# Patient Record
Sex: MALE
[De-identification: names, ages, dates, MRNs, and addresses within clinical notes are randomized per-mention and may not be internally consistent; named-entity substitution may affect disease eponyms.]

---

## 2019-06-23 ENCOUNTER — HOSPITAL ENCOUNTER (EMERGENCY)
Dept: HOSPITAL 97 - ER | Age: 24
Discharge: HOME | End: 2019-06-23
Payer: SELF-PAY
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
## 2019-06-23 DIAGNOSIS — Z23: ICD-10-CM
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
## 2019-06-23 DIAGNOSIS — S51.831A: Primary | ICD-10-CM

## 2019-06-23 PROCEDURE — 90714 TD VACC NO PRESV 7 YRS+ IM: CPT

## 2019-06-23 PROCEDURE — 90471 IMMUNIZATION ADMIN: CPT

## 2019-06-23 PROCEDURE — 99284 EMERGENCY DEPT VISIT MOD MDM: CPT

## 2019-06-23 NOTE — EDPHYS
Physician Documentation                                                                           

 [facility]                                                                 

Name: Sergio Alvarez Arellanes                                                                    

Age: 23 yrs                                                                                       

Sex: Male                                                                                         

: 1995                                                                                   

MRN: [id number]                                                                                   

Arrival Date: 2019                                                                          

Time: 01:21                                                                                       

Account#: [id number]                                                                            

Bed 12                                                                                            

Private MD:                                                                                       

ED Physician Prabhu Hyman                                                                             

HPI:                                                                                              

                                                                                             

02:38 This 23 yrs old  Male presents to ER via Ambulatory with complaints of Puncture pkl 

      Wound To Arm - by nail.                                                                     

02:38 The patient or guardian complains of pain, that is acute, nail puncture wound . The     pkl 

      complaints affect the right forearm. Onset: The symptoms/episode began/occurred just        

      prior to arrival.                                                                           

                                                                                                  

Historical:                                                                                       

- Allergies:                                                                                      

01:35 No Known Allergies;                                                                     aa1 

- Home Meds:                                                                                      

01:35 None [Active];                                                                          aa1 

- PMHx:                                                                                           

01:35 None;                                                                                   aa1 

- PSHx:                                                                                           

01:35 Tonsillectomy;                                                                          aa1 

                                                                                                  

- Immunization history:: Last tetanus immunization: unknown.                                      

- Social history:: Smoking status: Patient/guardian denies using tobacco.                         

- Ebola Screening: : No symptoms or risks identified at this time.                                

                                                                                                  

                                                                                                  

ROS:                                                                                              

02:38 Eyes: Negative for injury, pain, redness, and discharge, ENT: Negative for injury,      pkl 

      pain, and discharge, Neck: Negative for injury, pain, and swelling, Cardiovascular:         

      Negative for chest pain, palpitations, and edema, Respiratory: Negative for shortness       

      of breath, cough, wheezing, and pleuritic chest pain, Abdomen/GI: Negative for              

      abdominal pain, nausea, vomiting, diarrhea, and constipation, Back: Negative for injury     

      and pain, : Negative for injury, bleeding, discharge, and swelling.                       

02:38 MS/extremity: Positive for pain, puncture, of the right  forearm.                           

02:38 Neuro: Negative for altered mental status.                                                  

                                                                                                  

Exam:                                                                                             

02:38 Head/Face:  Normocephalic, atraumatic. Eyes:  Pupils equal round and reactive to light, pkl 

      extra-ocular motions intact.  Lids and lashes normal.  Conjunctiva and sclera are           

      non-icteric and not injected.  Cornea within normal limits.  Periorbital areas with no      

      swelling, redness, or edema. ENT:  Nares patent. No nasal discharge, no septal              

      abnormalities noted.  Tympanic membranes are normal and external auditory canals are        

      clear.  Oropharynx with no redness, swelling, or masses, exudates, or evidence of           

      obstruction, uvula midline.  Mucous membranes moist. Neck:  Trachea midline, no             

      thyromegaly or masses palpated, and no cervical lymphadenopathy.  Supple, full range of     

      motion without nuchal rigidity, or vertebral point tenderness.  No Meningismus.             

      Chest/axilla:  Normal chest wall appearance and motion.  Nontender with no deformity.       

      No lesions are appreciated. Cardiovascular:  Regular rate and rhythm with a normal S1       

      and S2.  No gallops, murmurs, or rubs.  Normal PMI, no JVD.  No pulse deficits.             

      Respiratory:  Lungs have equal breath sounds bilaterally, clear to auscultation and         

      percussion.  No rales, rhonchi or wheezes noted.  No increased work of breathing, no        

      retractions or nasal flaring. Abdomen/GI:  Soft, non-tender, with normal bowel sounds.      

      No distension or tympany.  No guarding or rebound.  No evidence of tenderness               

      throughout. Back:  No spinal tenderness.  No costovertebral tenderness.  Full range of      

      motion. Neuro:  Awake and alert, GCS 15, oriented to person, place, time, and               

      situation.  Cranial nerves II-XII grossly intact.  Motor strength 5/5 in all                

      extremities.  Sensory grossly intact.  Cerebellar exam normal.  Normal gait.                

02:38 Musculoskeletal/extremity: Extremities: grossly normal except: noted in the right           

      forearm: pain, No  active  bleeding  noted.                                                 

                                                                                                  

Vital Signs:                                                                                      

01:35  / 76; Pulse 65; Resp 16; Temp 98.5; Pulse Ox 98% on R/A; Weight 77.11 kg; Height aa1 

      5 ft. 7 in. (170.18 cm); Pain 6/10;                                                         

02:54  / 67; Pulse 62; Resp 16; Pulse Ox 99% on R/A; Pain 4/10;                         aa1 

01:35 Body Mass Index 26.63 (77.11 kg, 170.18 cm)                                             aa1 

                                                                                                  

Procedures:                                                                                       

02:38 Puncture wound thoroughly cleansed. Antibiotic started. Follow up with Dr. Mcclain in 2 pkl 

      to 3 days. Return if necessary. Patient understood instructions.                            

                                                                                                  

MDM:                                                                                              

01:59 Patient medically screened.                                                             pkl 

02:38 Data reviewed: vital signs, nurses notes, radiologic studies, plain films.              [facility] 

                                                                                                  

                                                                                             

02:03 Order name: Forearm Right XRAY                                                          [facility] 

                                                                                             

02:38 Order name: Arm-Sling; Complete Time: 02:47                                             pkl 

                                                                                                  

Administered Medications:                                                                         

02:17 Drug: Tetanus-Diphtheria Toxoid Adult 0.5 ml {: [employer]. Exp:         aa1 

      2021. Lot #: [id number]. } Route: IM; Site: right deltoid;                                

02:47 Follow up: Response: No adverse reaction                                                aa1 

02:18 Drug: Norco (7.5 mg-325 mg) 1 tabs Route: PO;                                           aa1 

02:54 Follow up: Response: No adverse reaction; Pain is decreased                             aa1 

02:53 Drug: KeFLEX 500 mg Route: PO;                                                          aa1 

02:53 Follow up: Response: No adverse reaction; Medication administered at discharge.         aa1 

                                                                                                  

                                                                                                  

Disposition:                                                                                      

19 02:46 Discharged to Home. Impression: Puncture wound right forearm.                      

- Condition is Stable.                                                                            

                                                                                                  

- Prescriptions for Keflex 500 mg Oral Capsule - take 1 capsule by ORAL route every 6             

  hours for 7 days; 28 capsule. Ultram 50 mg Oral Tablet - take 1 tablet by ORAL route            

  every 8 hours As needed; 20 tablet.                                                             

- Work release form, Medication Reconciliation Form, Thank You Letter, Antibiotic                 

  Education, Prescription Opioid Use form.                                                        

- Follow up: Luis Felipe Parkinson MD; When: 2 - 3 days; Reason: Re-evaluation by your                    

  physician.                                                                                      

- Problem is new.                                                                                 

- Symptoms have improved.                                                                         

                                                                                                  

                                                                                                  

                                                                                                  

Signatures:                                                                                       

Dispatcher [employer]                           Ca Morales RN                  RN   aa1                                                  

Prabhu Hyman MD MD   pkl                                                  

Addison Savage                                 ag4                                                  

                                                                                                  

Corrections: (The following items were deleted from the chart)                                    

03:14 02:46 2019 02:46 Discharged to Home. Impression: Puncture wound right forearm.    ag4 

      Condition is Stable. Forms are Medication Reconciliation Form, Thank You Letter,            

      Antibiotic Education, Prescription Opioid Use. Follow up: Luis Felipe Parkinson; When: 2 - 3          

      days; Reason: Re-evaluation by your physician. Problem is new. Symptoms have improved.      

      pkl                                                                                         

                                                                                                  

**************************************************************************************************

## 2019-06-23 NOTE — ER
Nurse's Notes                                                                                     

 [facility]                                                                 

Name: Sergio Alvarez Arellanes                                                                    

Age: 23 yrs                                                                                       

Sex: Male                                                                                         

: 1995                                                                                   

MRN: [id number]                                                                                   

Arrival Date: 2019                                                                          

Time: 01:21                                                                                       

Account#: [id number]                                                                            

Bed 12                                                                                            

Private MD:                                                                                       

Diagnosis: Puncture wound right forearm                                                           

                                                                                                  

Presentation:                                                                                     

                                                                                             

01:34 Presenting complaint: Patient states: a nail was sticking out from a door frame and it  aa1 

      punctured his R forearm approx 30 mins PTA. No active bleeding at this time. Transition     

      of care: patient was not received from another setting of care. Onset of symptoms was       

      2019. Risk Assessment: Do you want to hurt yourself or someone else? Patient       

      reports no desire to harm self or others. Initial Sepsis Screen: Does the patient meet      

      any 2 criteria? No. Patient's initial sepsis screen is negative. Does the patient have      

      a suspected source of infection? Yes: Skin breakdown/wound. Care prior to arrival: None.    

01:34 Method Of Arrival: Ambulatory                                                           aa1 

01:34 Acuity: CARMEL 4                                                                           aa1 

                                                                                                  

Triage Assessment:                                                                                

01:35 General: Appears in no apparent distress. comfortable, Behavior is calm, cooperative,   aa1 

      appropriate for age.                                                                        

                                                                                                  

Historical:                                                                                       

- Allergies:                                                                                      

01:35 No Known Allergies;                                                                     aa1 

- Home Meds:                                                                                      

01:35 None [Active];                                                                          aa1 

- PMHx:                                                                                           

01:35 None;                                                                                   aa1 

- PSHx:                                                                                           

01:35 Tonsillectomy;                                                                          aa1 

                                                                                                  

- Immunization history:: Last tetanus immunization: unknown.                                      

- Social history:: Smoking status: Patient/guardian denies using tobacco.                         

- Ebola Screening: : No symptoms or risks identified at this time.                                

                                                                                                  

                                                                                                  

Screenin:05 Abuse screen: Denies threats or abuse. Denies injuries from another. Nutritional        aa1 

      screening: No deficits noted. Tuberculosis screening: No symptoms or risk factors           

      identified. Fall Risk None identified.                                                      

                                                                                                  

Assessment:                                                                                       

02:05 General: Appears in no apparent distress. comfortable, Behavior is calm, cooperative,   aa1 

      appropriate for age. Pain: Complains of pain in palmar aspect of right forearm. Neuro:      

      Level of Consciousness is awake, alert, obeys commands, Oriented to person, place,          

      time, situation, Moves all extremities. Respiratory: Airway is patent Respiratory           

      effort is even, unlabored, Respiratory pattern is regular, symmetrical. GI: No signs        

      and/or symptoms were reported involving the gastrointestinal system. : No signs           

      and/or symptoms were reported regarding the genitourinary system. EENT: No signs and/or     

      symptoms were reported regarding the EENT system. Derm: Skin is intact, is healthy with     

      good turgor, Skin is pink, warm \T\ dry. Musculoskeletal: Circulation, motion, and          

      sensation intact. Capillary refill < 3 seconds, Range of motion: intact in all              

      extremities. Injury Description: Laceration sustained to palmar aspect of right forearm     

      is superficial, 0.5 to 2.5 cm long, not bleeding.                                           

02:54 Reassessment: Patient appears in no apparent distress at this time. Patient is alert,   aa1 

      oriented x 3, equal unlabored respirations, skin warm/dry/pink. Discussed d/c \T\ f/u       

      instructions with pt \T\ family; denies questions or concerns at this time Patient states   

      feeling better.                                                                             

                                                                                                  

Vital Signs:                                                                                      

01:35  / 76; Pulse 65; Resp 16; Temp 98.5; Pulse Ox 98% on R/A; Weight 77.11 kg; Height aa1 

      5 ft. 7 in. (170.18 cm); Pain 6/10;                                                         

02:54  / 67; Pulse 62; Resp 16; Pulse Ox 99% on R/A; Pain 4/10;                         aa1 

01:35 Body Mass Index 26.63 (77.11 kg, 170.18 cm)                                             aa1 

                                                                                                  

ED Course:                                                                                        

01:21 Patient arrived in ED.                                                                  am2 

01:34 Triage completed.                                                                       aa1 

01:35 Arm band placed on right wrist. Patient placed in waiting room, Patient notified of     aa1 

      wait time.                                                                                  

01:59 Prabhu Hyman MD is Attending Physician.                                                    pkl 

02:05 Patient has correct armband on for positive identification. Bed in low position. Call   aa1 

      light in reach.                                                                             

02:09 Ca Mejia RN is Primary Nurse.                                                aa1 

02:17 X-ray completed. Portable x-ray completed in exam room. Patient tolerated procedure     tm4 

      well.                                                                                       

02:18 Forearm Right XRAY In Process Unspecified.                                              EDMS

02:45 No provider procedures requiring assistance completed. Patient did not have IV access   aa1 

      during this emergency room visit. Dressings: Rosa Isela x 1 palmar aspect of right forearm       

      non-adherent dressing x 1 palmar aspect of right forearm. Sling applied to right arm.       

      Wound care: to laceration located on palmar aspect of right forearm was cleaned with        

      Hibiclens, irrigated with normal saline, dressed with Neosporin, Patient tolerated well.    

02:46 Luis Felipe Parkinson MD is Referral Physician.                                                 pkl 

                                                                                                  

Administered Medications:                                                                         

02:17 Drug: Tetanus-Diphtheria Toxoid Adult 0.5 ml {: [employer]. Exp:         aa1 

      2021. Lot #: [id number]. } Route: IM; Site: right deltoid;                                

02:47 Follow up: Response: No adverse reaction                                                aa1 

02:18 Drug: Norco (7.5 mg-325 mg) 1 tabs Route: PO;                                           aa1 

02:54 Follow up: Response: No adverse reaction; Pain is decreased                             aa1 

02:53 Drug: KeFLEX 500 mg Route: PO;                                                          aa1 

02:53 Follow up: Response: No adverse reaction; Medication administered at discharge.         aa1 

                                                                                                  

                                                                                                  

Outcome:                                                                                          

02:46 Discharge ordered by MD.                                                                pkl 

02:58 Discharged to home ambulatory, with family.                                             aa1 

02:58 Condition: good                                                                             

02:58 Discharge instructions given to patient, family, Instructed on discharge instructions,      

      follow up and referral plans. medication usage, wound care, Demonstrated understanding      

      of instructions, follow-up care, medications, wound care, Prescriptions given X 2.          

03:14 Patient left the ED.                                                                    ag4 

                                                                                                  

Signatures:                                                                                       

Dispatcher [employer]                           Ca Morales RN                  RN   aa1                                                  

Prabhu Hyman MD MD   pkl                                                  

Cassie Garcia                             4                                                  

Margaret Muse Adan                                 ag4                                                  

                                                                                                  

**************************************************************************************************

## 2019-06-23 NOTE — RAD REPORT
EXAM DESCRIPTION:  RAD - Forearm Right - 6/23/2019 2:16 am

 

CLINICAL HISTORY:  Puncture wound

 

COMPARISON:  None. Right arm

 

FINDINGS:  No fracture is identified. There is no dislocation or periosteal reaction noted.

Soft tissue wound is present posterior mid forearm. Air is seen in soft tissues. No foreign body conf
irmed.

 

IMPRESSION:  Posterior right forearm soft tissue wound. No foreign body confirmed.

 

No bone abnormality.

## 2022-03-29 ENCOUNTER — HOSPITAL ENCOUNTER (EMERGENCY)
Dept: HOSPITAL 97 - ER | Age: 27
Discharge: HOME | End: 2022-03-29
Payer: SELF-PAY

## 2022-03-29 VITALS — OXYGEN SATURATION: 100 % | TEMPERATURE: 98.7 F | DIASTOLIC BLOOD PRESSURE: 83 MMHG | SYSTOLIC BLOOD PRESSURE: 143 MMHG

## 2022-03-29 DIAGNOSIS — F41.9: Primary | ICD-10-CM

## 2022-03-29 PROCEDURE — 99283 EMERGENCY DEPT VISIT LOW MDM: CPT

## 2022-03-29 NOTE — EDPHYS
Physician Documentation                                                                           

 [facility]                                                                 

Name: Sergio Alvarez Arellanes                                                                    

Age: 26 yrs                                                                                       

Sex: Male                                                                                         

: 1995                                                                                   

MRN: [id number]                                                                                   

Arrival Date: 2022                                                                          

Time: 20:24                                                                                       

Account#: [id number]                                                                            

Bed 10                                                                                            

Private MD:                                                                                       

ED Physician Kenroy Garcia                                                                      

HPI:                                                                                              

                                                                                             

23:47 This 26 yrs old  Male presents to ER via Ambulatory with complaints of High     jr8 

      Blood Pressure.                                                                             

23:47 Onset: The symptoms/episode began/occurred acutely, today. Associated signs and         jr8 

      symptoms: Pertinent positives: headache. The patient has experienced similar episodes       

      in the past, a few times. The patient has not recently seen a physician. Patient stated     

      that he has had mild headaches on and off for the past several days. Also with mild         

      anxiety. Today blood pressure was elevated. Denies any other symptoms at this time.         

                                                                                                  

Historical:                                                                                       

- Allergies:                                                                                      

21:04 No Known Allergies;                                                                     vc1 

- Home Meds:                                                                                      

21:04 None [Active];                                                                          vc1 

- PMHx:                                                                                           

21:04 None;                                                                                   vc1 

- PSHx:                                                                                           

21:04 None;                                                                                   vc1 

                                                                                                  

- Immunization history:: Adult Immunizations up to date, Client reports having NOT                

  received the Covid vaccine. Flu vaccine is not up to date.                                      

- Social history:: Smoking status: unknown.                                                       

                                                                                                  

                                                                                                  

ROS:                                                                                              

23:47 Eyes: Negative for injury, pain, redness, and discharge, ENT: Negative for injury,      jr8 

      pain, and discharge, Neck: Negative for injury, pain, and swelling, Cardiovascular:         

      Negative for chest pain, palpitations, and edema, Respiratory: Negative for shortness       

      of breath, cough, wheezing, and pleuritic chest pain, Abdomen/GI: Negative for              

      abdominal pain, nausea, vomiting, diarrhea, and constipation, Back: Negative for injury     

      and pain, MS/Extremity: Negative for injury and deformity, Skin: Negative for injury,       

      rash, and discoloration.                                                                    

23:47 Neuro: Positive for headache, Negative for dizziness, gait disturbance, numbness,           

      tingling, visual changes, weakness.                                                         

23:47 Psych: Positive for anxiety.                                                                

                                                                                                  

Exam:                                                                                             

23:47 Constitutional:  This is a well developed, well nourished patient who is awake, alert,  jr8 

      and in no acute distress. Neck:  Trachea midline, no thyromegaly or masses palpated,        

      and no cervical lymphadenopathy.  Supple, full range of motion without nuchal rigidity,     

      or vertebral point tenderness.  No Meningismus. Cardiovascular:  Regular rate and           

      rhythm with a normal S1 and S2.  No gallops, murmurs, or rubs.  Normal PMI, no JVD.  No     

      pulse deficits. Respiratory:  Lungs have equal breath sounds bilaterally, clear to          

      auscultation and percussion.  No rales, rhonchi or wheezes noted.  No increased work of     

      breathing, no retractions or nasal flaring. Abdomen/GI:  Soft, non-tender, with normal      

      bowel sounds.  No distension or tympany.  No guarding or rebound.  No evidence of           

      tenderness throughout. Back:  No spinal tenderness.  No costovertebral tenderness.          

      Full range of motion. Skin:  Warm, dry with normal turgor.  Normal color with no            

      rashes, no lesions, and no evidence of cellulitis. MS/ Extremity:  Pulses equal, no         

      cyanosis.  Neurovascular intact.  Full, normal range of motion. Neuro:  Awake and           

      alert, GCS 15, oriented to person, place, time, and situation.  Cranial nerves II-XII       

      grossly intact.  Motor strength 5/5 in all extremities.  Sensory grossly intact.            

      Cerebellar exam normal.  Normal gait. Psych:  Awake, alert, with orientation to person,     

      place and time.  Behavior, mood, and affect are within normal limits.                       

                                                                                                  

Vital Signs:                                                                                      

21:05 Weight 75.3 kg; Height 5 ft. 7 in. (170.18 cm);                                         vc1 

21:16  / 83; Pulse 91; Resp 18; Temp 98.7(TE); Pulse Ox 100% on R/A; Weight 74.84 kg;   ld1 

      Height 5 ft. 7 in. (170.18 cm); Pain 0/10;                                                  

21:16 Body Mass Index 25.84 (74.84 kg, 170.18 cm)                                             ld1 

                                                                                                  

MDM:                                                                                              

21:00 Patient medically screened.                                                             [facility] 

21:28 Data reviewed: vital signs, nurses notes, and as a result, I will discharge patient.    jr8 

      Data interpreted: Pulse oximetry: on room air is 100 %. Interpretation: normal.             

      Counseling: I had a detailed discussion with the patient and/or guardian regarding: the     

      historical points, exam findings, and any diagnostic results supporting the                 

      discharge/admit diagnosis, the need for outpatient follow up, a family practitioner, to     

      return to the emergency department if symptoms worsen or persist or if there are any        

      questions or concerns that arise at home. ED course: Patient feeling much better. Blood     

      pressure stable at this point and will not treated with medication. Recommended that he     

      keep a log to ensure that he is not having spikes in his blood pressure. Patient seems      

      to be having anxiety which she has had in the past as well. We will start him on            

      hydroxyzine to see how he does. If he worsens any point time to come back for further       

      evaluation. Patient good with plan at this time. Otherwise needs to follow-up with          

      primary care physician..                                                                    

                                                                                                  

Administered Medications:                                                                         

No medications were administered                                                                  

                                                                                                  

                                                                                                  

Disposition Summary:                                                                              

22 21:29                                                                                    

Discharge Ordered                                                                                 

      Location: Home                                                                          jr8 

      Problem: new                                                                            jr8 

      Symptoms: have improved                                                                 jr8 

      Condition: Stable                                                                       jr8 

      Diagnosis                                                                                   

        - Anxiety disorder, unspecified                                                       jr8 

      Followup:                                                                               jr8 

        - With: Private Physician                                                                  

        - When: 1 week                                                                             

        - Reason: Recheck today's complaints, Continuance of care, Re-evaluation by your           

      physician                                                                                   

      Discharge Instructions:                                                                     

        - Discharge Summary Sheet                                                             jr8 

        - Panic Attack                                                                        jr8 

      Forms:                                                                                      

        - Medication Reconciliation Form                                                      jr8 

        - Thank You Letter                                                                    jr8 

        - Antibiotic Education                                                                jr8 

        - Prescription Opioid Use                                                             jr8 

      Prescriptions:                                                                              

        - Hydroxyzine HCl 50 mg Oral Tablet                                                        

            - take 1 tablet by ORAL route every 8 hours As needed; 20 tablet; Refills: 0,     jr8 

      Product Selection Permitted                                                                 

Addendum:                                                                                         

2022                                                                                        

     06:37 Co-signature as Attending Physician, Kenroy Garcia MD I agree with the assessment and  c
ha

           plan of care.                                                                          

                                                                                                  

Signatures:                                                                                       

Kenroy Garcia MD MD cha Roszak, Josh, PA PA   jr8                                                  

Krystle Hurst RN                    RN   vc1                                                  

                                                                                                  

Corrections: (The following items were deleted from the chart)                                    

                                                                                             

23:48 23:47 Constitutional: This is a well developed, well nourished patient who is awake,    jr8 

      alert, and in no acute distress. Neck: Trachea midline, no thyromegaly or masses            

      palpated, and no cervical lymphadenopathy. Supple, full range of motion without nuchal      

      rigidity, or vertebral point tenderness. No Meningismus. Cardiovascular: Regular rate       

      and rhythm with a normal S1 and S2. No gallops, murmurs, or rubs. Normal PMI, no JVD.       

      No pulse deficits. Respiratory: Lungs have equal breath sounds bilaterally, clear to        

      auscultation and percussion. No rales, rhonchi or wheezes noted. No increased work of       

      breathing, no retractions or nasal flaring. Abdomen/GI: Soft, non-tender, with normal       

      bowel sounds. No distension or tympany. No guarding or rebound. No evidence of              

      tenderness throughout. Back: No spinal tenderness. No costovertebral tenderness. Full       

      range of motion. Skin: Warm, dry with normal turgor. Normal color with no rashes, no        

      lesions, and no evidence of cellulitis. MS/ Extremity: Pulses equal, no cyanosis.           

      Neurovascular intact. Full, normal range of motion. Neuro: Awake and alert, GCS 15,         

      oriented to person, place, time, and situation. Cranial nerves II-XII grossly intact.       

      Motor strength 5/5 in all extremities. Sensory grossly intact. Cerebellar exam normal.      

      Normal gait. jr8                                                                            

                                                                                                  

**************************************************************************************************

## 2022-03-29 NOTE — ER
Nurse's Notes                                                                                     

 [facility]                                                                 

Name: Sergio Alvarez Arellanes                                                                    

Age: 26 yrs                                                                                       

Sex: Male                                                                                         

: 1995                                                                                   

MRN: [id number]                                                                                   

Arrival Date: 2022                                                                          

Time: 20:24                                                                                       

Account#: [id number]                                                                            

Bed 10                                                                                            

Private MD:                                                                                       

Diagnosis: Anxiety disorder, unspecified                                                          

                                                                                                  

Presentation:                                                                                     

                                                                                             

21:02 Chief complaint: Patient states: "I have had a headache for the last 6 days, I checked  vc1 

      my blood pressure and it was high in the 190's". Coronavirus screen: Vaccine status:        

      Patient reports being unvaccinated. At this time, the client does not indicate any          

      symptoms associated with coronavirus-19. Ebola Screen: No symptoms or risks identified      

      at this time. Risk Assessment: Do you want to hurt yourself or someone else? Patient        

      reports no desire to harm self or others. Onset of symptoms is unknown.                     

21:02 Method Of Arrival: Ambulatory                                                           vc1 

21:02 Acuity: CARMEL 3                                                                           vc1 

                                                                                                  

Triage Assessment:                                                                                

21:04 General: Appears in no apparent distress. comfortable, Behavior is calm, cooperative,   vc1 

      appropriate for age. Pain: Complains of pain in headache Pain does not radiate.             

                                                                                                  

Historical:                                                                                       

- Allergies:                                                                                      

21:04 No Known Allergies;                                                                     vc1 

- Home Meds:                                                                                      

21:04 None [Active];                                                                          vc1 

- PMHx:                                                                                           

21:04 None;                                                                                   vc1 

- PSHx:                                                                                           

21:04 None;                                                                                   vc1 

                                                                                                  

- Immunization history:: Adult Immunizations up to date, Client reports having NOT                

  received the Covid vaccine. Flu vaccine is not up to date.                                      

- Social history:: Smoking status: unknown.                                                       

                                                                                                  

                                                                                                  

Screenin:16 Abuse screen: Denies threats or abuse. Denies injuries from another. Nutritional        ld1 

      screening: No deficits noted. Tuberculosis screening: No symptoms or risk factors           

      identified. Fall Risk None identified.                                                      

                                                                                                  

Assessment:                                                                                       

21:16 Reassessment: see triage assessment Patient denies pain at this time.                   ld1 

                                                                                                  

Vital Signs:                                                                                      

21:05 Weight 75.3 kg; Height 5 ft. 7 in. (170.18 cm);                                         vc1 

21:16  / 83; Pulse 91; Resp 18; Temp 98.7(TE); Pulse Ox 100% on R/A; Weight 74.84 kg;   ld1 

      Height 5 ft. 7 in. (170.18 cm); Pain 0/10;                                                  

21:16 Body Mass Index 25.84 (74.84 kg, 170.18 cm)                                             ld1 

                                                                                                  

ED Course:                                                                                        

20:24 Patient arrived in ED.                                                                  kz  

20:59 [facility]                                                               jr8 

20:59 Kenroy Garcia MD is Attending Physician.                                             jr8 

21:04 Triage completed.                                                                       vc1 

21:05 Arm band placed on right wrist.                                                         vc1 

21:07 Radha Maldonado, RN is Primary Nurse.                                                   ld1 

21:16 Patient has correct armband on for positive identification. Placed in gown. Bed in low  ld1 

      position. Call light in reach. Side rails up X2. Pulse ox on. NIBP on. Door closed.         

      Noise minimized. Warm blanket given.                                                        

21:16 No provider procedures requiring assistance completed.                                  ld1 

21:36 Patient did not have IV access during this emergency room visit.                        ld1 

                                                                                                  

Administered Medications:                                                                         

No medications were administered                                                                  

                                                                                                  

                                                                                                  

Outcome:                                                                                          

21:29 Discharge ordered by MD.                                                                jr8 

21:36 Discharged to home ambulatory.                                                          ld1 

21:36 Condition: stable                                                                           

21:36 Discharge instructions given to patient, Instructed on discharge instructions, follow       

      up and referral plans. medication usage, Demonstrated understanding of instructions,        

      follow-up care, medications, Prescriptions given X 1.                                       

21:36 Patient left the ED.                                                                    ld1 

                                                                                                  

Signatures:                                                                                       

Justice Osorio PA PA   jr8                                                  

Radha Mladonado, RN                     RN   ld1                                                  

Krystle Hurst RN                    RN   vc1                                                  

Skye Thompson                                                   

                                                                                                  

**************************************************************************************************